# Patient Record
Sex: MALE | Race: WHITE | URBAN - METROPOLITAN AREA
[De-identification: names, ages, dates, MRNs, and addresses within clinical notes are randomized per-mention and may not be internally consistent; named-entity substitution may affect disease eponyms.]

---

## 2025-02-07 ENCOUNTER — OFFICE VISIT (OUTPATIENT)
Dept: PRIMARY CARE | Facility: CLINIC | Age: 70
End: 2025-02-07
Payer: MEDICARE

## 2025-02-07 VITALS
HEIGHT: 68 IN | DIASTOLIC BLOOD PRESSURE: 78 MMHG | OXYGEN SATURATION: 94 % | SYSTOLIC BLOOD PRESSURE: 118 MMHG | HEART RATE: 69 BPM | BODY MASS INDEX: 28.79 KG/M2 | WEIGHT: 190 LBS

## 2025-02-07 DIAGNOSIS — E55.9 VITAMIN D DEFICIENCY: ICD-10-CM

## 2025-02-07 DIAGNOSIS — F32.A ANXIETY AND DEPRESSION: Primary | ICD-10-CM

## 2025-02-07 DIAGNOSIS — G47.09 OTHER INSOMNIA: ICD-10-CM

## 2025-02-07 DIAGNOSIS — J90 PLEURAL EFFUSION: ICD-10-CM

## 2025-02-07 DIAGNOSIS — Z11.59 SCREENING FOR VIRAL DISEASE: ICD-10-CM

## 2025-02-07 DIAGNOSIS — I10 HTN (HYPERTENSION), BENIGN: ICD-10-CM

## 2025-02-07 DIAGNOSIS — E78.2 MIXED HYPERLIPIDEMIA: ICD-10-CM

## 2025-02-07 DIAGNOSIS — Z00.00 MEDICARE ANNUAL WELLNESS VISIT, SUBSEQUENT: ICD-10-CM

## 2025-02-07 DIAGNOSIS — R06.02 SOB (SHORTNESS OF BREATH) ON EXERTION: ICD-10-CM

## 2025-02-07 DIAGNOSIS — F41.9 ANXIETY AND DEPRESSION: Primary | ICD-10-CM

## 2025-02-07 DIAGNOSIS — M54.16 LUMBAR RADICULOPATHY: ICD-10-CM

## 2025-02-07 DIAGNOSIS — Z12.5 SCREENING FOR MALIGNANT NEOPLASM OF PROSTATE: ICD-10-CM

## 2025-02-07 PROCEDURE — 99214 OFFICE O/P EST MOD 30 MIN: CPT | Mod: 25 | Performed by: FAMILY MEDICINE

## 2025-02-07 PROCEDURE — 99215 OFFICE O/P EST HI 40 MIN: CPT | Performed by: FAMILY MEDICINE

## 2025-02-07 PROCEDURE — 99397 PER PM REEVAL EST PAT 65+ YR: CPT | Performed by: FAMILY MEDICINE

## 2025-02-07 RX ORDER — SERTRALINE HYDROCHLORIDE 100 MG/1
200 TABLET, FILM COATED ORAL
COMMUNITY
Start: 2024-01-10 | End: 2025-02-07 | Stop reason: SDUPTHER

## 2025-02-07 RX ORDER — METHOCARBAMOL 750 MG/1
750 TABLET, FILM COATED ORAL 3 TIMES DAILY
Qty: 270 TABLET | Refills: 1 | Status: SHIPPED | OUTPATIENT
Start: 2025-02-07 | End: 2025-08-06

## 2025-02-07 RX ORDER — TRAZODONE HYDROCHLORIDE 150 MG/1
1 TABLET ORAL NIGHTLY
COMMUNITY
Start: 2024-10-01 | End: 2025-02-07 | Stop reason: SDUPTHER

## 2025-02-07 RX ORDER — ATORVASTATIN CALCIUM 20 MG/1
20 TABLET, FILM COATED ORAL
Qty: 90 TABLET | Refills: 1 | Status: SHIPPED | OUTPATIENT
Start: 2025-02-07 | End: 2025-08-06

## 2025-02-07 RX ORDER — BUSPIRONE HYDROCHLORIDE 15 MG/1
15 TABLET ORAL EVERY 12 HOURS PRN
COMMUNITY
Start: 2024-01-10 | End: 2025-02-07 | Stop reason: SDUPTHER

## 2025-02-07 RX ORDER — LISINOPRIL 2.5 MG/1
2.5 TABLET ORAL
Qty: 90 TABLET | Refills: 1 | Status: SHIPPED | OUTPATIENT
Start: 2025-02-07 | End: 2025-08-06

## 2025-02-07 RX ORDER — GABAPENTIN 300 MG/1
1200 CAPSULE ORAL 3 TIMES DAILY
Qty: 1080 CAPSULE | Refills: 1 | Status: SHIPPED | OUTPATIENT
Start: 2025-02-07 | End: 2025-08-06

## 2025-02-07 RX ORDER — TRAZODONE HYDROCHLORIDE 150 MG/1
150 TABLET ORAL NIGHTLY
Qty: 90 TABLET | Refills: 1 | Status: SHIPPED | OUTPATIENT
Start: 2025-02-07 | End: 2025-08-06

## 2025-02-07 RX ORDER — SERTRALINE HYDROCHLORIDE 100 MG/1
200 TABLET, FILM COATED ORAL DAILY
Qty: 180 TABLET | Refills: 1 | Status: SHIPPED | OUTPATIENT
Start: 2025-02-07 | End: 2025-08-06

## 2025-02-07 RX ORDER — LISINOPRIL 2.5 MG/1
2.5 TABLET ORAL
COMMUNITY
Start: 2024-02-24 | End: 2025-02-07 | Stop reason: SDUPTHER

## 2025-02-07 RX ORDER — BUSPIRONE HYDROCHLORIDE 15 MG/1
15 TABLET ORAL 2 TIMES DAILY
Qty: 180 TABLET | Refills: 1 | Status: SHIPPED | OUTPATIENT
Start: 2025-02-07 | End: 2025-08-06

## 2025-02-07 RX ORDER — METHOCARBAMOL 750 MG/1
TABLET, FILM COATED ORAL
COMMUNITY
Start: 2024-10-24 | End: 2025-02-07 | Stop reason: SDUPTHER

## 2025-02-07 RX ORDER — GABAPENTIN 300 MG/1
1200 CAPSULE ORAL 3 TIMES DAILY
COMMUNITY
Start: 2024-12-30 | End: 2025-02-07 | Stop reason: SDUPTHER

## 2025-02-07 RX ORDER — ATORVASTATIN CALCIUM 20 MG/1
20 TABLET, FILM COATED ORAL
COMMUNITY
Start: 2024-01-19 | End: 2025-02-07 | Stop reason: SDUPTHER

## 2025-02-07 ASSESSMENT — ENCOUNTER SYMPTOMS
OCCASIONAL FEELINGS OF UNSTEADINESS: 0
DEPRESSION: 0
LOSS OF SENSATION IN FEET: 0

## 2025-02-07 ASSESSMENT — PATIENT HEALTH QUESTIONNAIRE - PHQ9
1. LITTLE INTEREST OR PLEASURE IN DOING THINGS: NOT AT ALL
SUM OF ALL RESPONSES TO PHQ9 QUESTIONS 1 AND 2: 0
SUM OF ALL RESPONSES TO PHQ9 QUESTIONS 1 AND 2: 0
1. LITTLE INTEREST OR PLEASURE IN DOING THINGS: NOT AT ALL
2. FEELING DOWN, DEPRESSED OR HOPELESS: NOT AT ALL
2. FEELING DOWN, DEPRESSED OR HOPELESS: NOT AT ALL
1. LITTLE INTEREST OR PLEASURE IN DOING THINGS: NOT AT ALL
SUM OF ALL RESPONSES TO PHQ9 QUESTIONS 1 AND 2: 0
2. FEELING DOWN, DEPRESSED OR HOPELESS: NOT AT ALL

## 2025-02-07 ASSESSMENT — COLUMBIA-SUICIDE SEVERITY RATING SCALE - C-SSRS
6. HAVE YOU EVER DONE ANYTHING, STARTED TO DO ANYTHING, OR PREPARED TO DO ANYTHING TO END YOUR LIFE?: NO
2. HAVE YOU ACTUALLY HAD ANY THOUGHTS OF KILLING YOURSELF?: NO
1. IN THE PAST MONTH, HAVE YOU WISHED YOU WERE DEAD OR WISHED YOU COULD GO TO SLEEP AND NOT WAKE UP?: NO

## 2025-02-07 ASSESSMENT — ACTIVITIES OF DAILY LIVING (ADL)
DOING_HOUSEWORK: INDEPENDENT
TAKING_MEDICATION: INDEPENDENT
MANAGING_FINANCES: INDEPENDENT
TAKING_MEDICATION: INDEPENDENT
DOING_HOUSEWORK: INDEPENDENT
GROCERY_SHOPPING: INDEPENDENT
BATHING: INDEPENDENT
GROCERY_SHOPPING: INDEPENDENT
DRESSING: INDEPENDENT
MANAGING_FINANCES: INDEPENDENT
DRESSING: INDEPENDENT
BATHING: INDEPENDENT

## 2025-02-07 ASSESSMENT — PAIN SCALES - GENERAL: PAINLEVEL_OUTOF10: 4

## 2025-02-07 NOTE — PROGRESS NOTES
70-year-old presents to clinic for  Medicare annual this visit and follow-up chronic medical conditions    Health Maintenance:  Eats a varied and healthy diet.  Exercises regularly.  Does not drink, smoke, use illicit substances.  Otherwise up-to-date on all routine health maintenance screenings.  Up-to-date on recommended immunizations.  Due for yearly lab work.      1. Anxiety and depression   Currently on BuSpar and 200 mg Zoloft.  States he still experiences anxiety fairly regularly and has not attempted other medications.  Has never trialed Cymbalta.   2. HTN (hypertension), benign   Blood pressure 118/78 no chest pain or shortness of Laila currently on lisinopril   3. Mixed hyperlipidemia   Currently on atorvastatin 20 mg.  No chest pain or shortness of breath.  Goal LDL less than 100   4. Lumbar radiculopathy   Status postsurgical interventions currently on gabapentin 3 times daily and methocarbamol which she is finding benefit from has never attempted Cymbalta   5. Medicare annual wellness visit, subsequent    6. SOB (shortness of breath) on exertion   Or recent is been experiencing some mild shortness of breath on exertion but denies any chest pain.  During his hospitalization x-rays were obtained of his chest which did reveal a mild: Effusion on the left as well as mild atelectasis.  Pulse oxygen is 94%.  Does have a smoking history but no longer is smoking.  Has never had pulmonary function test and recent history   7. Vitamin D deficiency    8. Other insomnia   Well-controlled on trazodone   9. Screening for malignant neoplasm of prostate    10. Screening for viral disease    11. Pleural effusion        All pertinent positive symptoms are included in history of present illness.    All other systems have been reviewed and are negative and noncontributory to this patient's current ailments.    CONSTITUTIONAL - INAD. Not ill appearing.  SKIN - No lesions or rashes visualized. Good skin turgor.  HEENT- Head  is atraumativc and normocephalic. Nasal turbinates are nonerythematous and without drainage. .  RESP - CTAB. No wheezing, rhonchi, or crackles.   CARDIAC - RRR. No murmurs, gallops, or rubs.  ABDOMEN - Soft, nontender, nondistended. No organomegaly.  NEURO - CNs 2-12 grossly intact.  PSYCH - Normal mood and affect      1. Anxiety and depression (Primary)  Fairly stable at this point however could trial Cymbalta for added benefit for neuropathic pain continue medications as prescribed and will address at future visit  - busPIRone (Buspar) 15 mg tablet; Take 1 tablet (15 mg) by mouth 2 times a day.  Dispense: 180 tablet; Refill: 1  - sertraline (Zoloft) 100 mg tablet; Take 2 tablets (200 mg) by mouth once daily.  Dispense: 180 tablet; Refill: 1    2. HTN (hypertension), benign  Had a discussion with the patient about hypertension.  Discussed the natural history and course of hypertension and need for controlling blood pressure.  Discussed the cost benefit of treating hypertension with medication and how lowering blood pressure to goal levels will help reduce the risk of heart attacks and strokes in the future.     Discussed lifestyle interventions including regular cardio aerobic exercise 30 minutes daily at least 3 times a week, hopefully more.  Discussed dietary interventions to help lower blood pressure.    Recommend patient get a blood pressure cuff and begin measuring blood pressure at home and keeping a log.  Advised the patient bring the log with them at their next visit so that we can find out the average blood pressure reading and determine whether or not the treatment is working.    If patient is already taking medication, I recommended continuing or changing medication depending on blood pressure control.    Appropriate lab work was ordered.  - lisinopril 2.5 mg tablet; Take 1 tablet (2.5 mg) by mouth once daily.  Dispense: 90 tablet; Refill: 1    3. Mixed hyperlipidemia  Discussed with patient about the  natural history and course of hyperlipidemia.  Discussed lifestyle as well as genetic implications of hyperlipidemia.    Discussed possible treatment options of hyperlipidemia including intensive lifestyle interventions including lower carbohydrate, lower saturated fat diet as well as increasing aerobic and resistance training exercise to at least 30 minutes daily 3 times a week, hopefully more.  Discussed medication options including the use of statin medications as well as the side effects of these medications.    Discussed the cost benefit analysis of lowering cholesterol and how it will help prevent heart attacks and strokes in the future, also discussed the benefit of statin medications and the large amount of studies showing a reduction in morbidity mortality with the use of a statin medication in the setting of multiple risk factors for heart attacks and strokes.    If medication was prescribed or continued, the appropriate lab work was ordered.  - atorvastatin (Lipitor) 20 mg tablet; Take 1 tablet (20 mg) by mouth once daily.  Dispense: 90 tablet; Refill: 1  - CBC and Auto Differential; Future  - Comprehensive Metabolic Panel; Future  - Lipid Panel; Future  - CBC and Auto Differential  - Comprehensive Metabolic Panel  - Lipid Panel    4. Lumbar radiculopathy  Currently stable continue gabapentin and Robaxin continue follow-up with spine specialist  - gabapentin (Neurontin) 300 mg capsule; Take 4 capsules (1,200 mg) by mouth 3 times a day.  Dispense: 1080 capsule; Refill: 1  - methocarbamol (Robaxin) 750 mg tablet; Take 1 tablet (750 mg) by mouth 3 times a day.  Dispense: 270 tablet; Refill: 1    5. Medicare annual wellness visit, subsequent  Cardiovascular disease discussion was had including discussions of chronic medical conditions such as hypertension, hyperlipidemia, CAD, or others. 15 minutes was spent in discussion.  and Depression screening was completed. 5 minutes was spent in this discussion.    -  Comprehensive Metabolic Panel; Future  - TSH with reflex to Free T4 if abnormal; Future  - Comprehensive Metabolic Panel  - TSH with reflex to Free T4 if abnormal    6. SOB (shortness of breath) on exertion  Unsure etiology pulse oxing is mildly depressed at 94% today patient does have a pleural effusion on recent x-ray as well as mild atelectasis.  Smoking history.  Possible mild COPD unsure will send for pulmonary function testing and referral to pulmonary    7. Vitamin D deficiency    - Vitamin D 25-Hydroxy,Total (for eval of Vitamin D levels); Future  - Vitamin D 25-Hydroxy,Total (for eval of Vitamin D levels)    8. Other insomnia  Well-controlled continue trazodone  - traZODone (Desyrel) 150 mg tablet; Take 1 tablet (150 mg) by mouth once daily at bedtime.  Dispense: 90 tablet; Refill: 1    9. Screening for malignant neoplasm of prostate    - Prostate Specific Antigen, Screen; Future  - Prostate Specific Antigen, Screen    10. Screening for viral disease    - Hepatitis C antibody; Future  - Hepatitis C antibody    11. Pleural effusion  See above  - Referral to Pulmonology; Future  - Complete Pulmonary Function Test (Spirometry/DLCO/Lung Volumes); Future

## 2025-02-13 DIAGNOSIS — N52.01 ERECTILE DYSFUNCTION DUE TO ARTERIAL INSUFFICIENCY: Primary | ICD-10-CM

## 2025-02-13 RX ORDER — SILDENAFIL 100 MG/1
100 TABLET, FILM COATED ORAL
COMMUNITY
Start: 2024-03-25 | End: 2025-02-13 | Stop reason: SDUPTHER

## 2025-02-13 RX ORDER — SILDENAFIL 100 MG/1
100 TABLET, FILM COATED ORAL DAILY PRN
Qty: 30 TABLET | Refills: 11 | Status: SHIPPED | OUTPATIENT
Start: 2025-02-13 | End: 2026-02-13

## 2025-02-19 ENCOUNTER — APPOINTMENT (OUTPATIENT)
Dept: RESPIRATORY THERAPY | Facility: CLINIC | Age: 70
End: 2025-02-19
Payer: MEDICARE

## 2025-02-24 ENCOUNTER — APPOINTMENT (OUTPATIENT)
Dept: PULMONOLOGY | Facility: HOSPITAL | Age: 70
End: 2025-02-24
Payer: MEDICARE

## 2025-03-31 DIAGNOSIS — N52.01 ERECTILE DYSFUNCTION DUE TO ARTERIAL INSUFFICIENCY: ICD-10-CM

## 2025-04-01 RX ORDER — SILDENAFIL 100 MG/1
100 TABLET, FILM COATED ORAL DAILY PRN
Qty: 30 TABLET | Refills: 11 | Status: SHIPPED | OUTPATIENT
Start: 2025-04-01 | End: 2026-04-01

## 2025-06-10 ENCOUNTER — APPOINTMENT (OUTPATIENT)
Dept: PRIMARY CARE | Facility: CLINIC | Age: 70
End: 2025-06-10
Payer: MEDICARE